# Patient Record
(demographics unavailable — no encounter records)

---

## 2025-03-19 NOTE — PHYSICAL EXAM
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  [de-identified] : There is a some tympanosclerosis in the inferior quadrant.

## 2025-03-19 NOTE — ASSESSMENT
[FreeTextEntry1] : The patient is doing well postoperatively. Return to Clinic as needed or if symptoms return.  otherwise fu in 1 year for ear recheck. There is a some tympanosclerosis in the inferior quadrant.

## 2025-03-19 NOTE — REASON FOR VISIT
[Subsequent Evaluation] : a subsequent evaluation for [Mother] : mother [FreeTextEntry2] : s/p Bilateral patch myringoplasty 2/24/22

## 2025-03-19 NOTE — CONSULT LETTER
[Dear  ___] : Dear  [unfilled], [Consult Letter:] : I had the pleasure of evaluating your patient, [unfilled]. [Please see my note below.] : Please see my note below. [Consult Closing:] : Thank you very much for allowing me to participate in the care of this patient.  If you have any questions, please do not hesitate to contact me. [Sincerely,] : Sincerely, [FreeTextEntry2] :  Dr. Yulissa Fox [address] [id number] [FreeTextEntry3] : Payal Corbin MD   Pediatric Otolaryngology/ Head & Neck Surgery [facility] , Otolaryngology; [facility]  [facility] [place] [address] Tel [phone] Fax [phone]

## 2025-03-19 NOTE — SURGICAL HISTORY
[TextEntry] :  Left ear lobular fat graft with left tympanic membrane fat myringoplasty and right ear wax removal Bilateral patch myringoplasty Bilateral myringotomy and tube placement.

## 2025-03-19 NOTE — HISTORY OF PRESENT ILLNESS
[de-identified] : 10 y/o girl presents for follow up left perf History of ETD. s/p Bilateral patch myringoplasty 2/24/22  She is s.p LT FAT GRAFT. MYRINGOPLASTY, 9/13/2024 Difficulty hearing from left ear since surgery last year  Occasional brownish colored otorrhea from left ear treated with ototopical drops Continues with speech services for her speech articulation and burak No otalgia  Denies nasal congestion or anterior rhinorrhea